# Patient Record
(demographics unavailable — no encounter records)

---

## 2025-03-28 NOTE — HISTORY OF PRESENT ILLNESS
[N] : Patient denies prior pregnancies [TextBox_4] : 22-year-old  0 LMP: 2025, presents for her first GYN examination.  Medical history significant for thyroid cancer. Status post complete thyroidectomy in . She also reports experiencing depression and thoughts of harming herself, has never talked to anyone about it. She noticed recent increased malodorous vaginal odor. [PapSmeardate] : as per patient she never has done before [LMPDate] : 03/24/2025 [MensesFreq] : irregular due to thyroidectomy [MensesLength] : 3 [PGHxTotal] : 0 [FreeTextEntry1] : 03/24/2025

## 2025-03-28 NOTE — PHYSICAL EXAM
[Chaperone Present] : A chaperone was present in the examining room during all aspects of the physical examination [Oriented x3] : oriented x3 [Examination Of The Breasts] : a normal appearance [No Discharge] : no discharge [No Masses] : no breast masses were palpable [Labia Majora] : normal [Labia Minora] : normal [No Bleeding] : There was no active vaginal bleeding [Normal] : normal [Normal Position] : in a normal position [Uterine Adnexae] : normal [FreeTextEntry2] : Appropriately responsive, alert, and no acute distress. [FreeTextEntry3] : Status post thyroidectomy. Thyroid is absent. [FreeTextEntry7] : Soft. Nondistended. Nontender. Mild lower quadrant tenderness. No rebound or guarding. There are no palpable masses. [FreeTextEntry1] : External genitalia are within normal limits with no lesions visualized. [FreeTextEntry4] : Scant amount of blood noted within the vaginal vault consistent with menstruation. No discharge or lesions noted. [FreeTextEntry5] : A speculum was inserted without any difficulty. The cervix was normal in appearance. Pap smear and cultures were obtained. No cervical motion tenderness. [FreeTextEntry6] : Bimanual examination was notable for a normal, nontender uterus. Mild uterine tenderness. There were no palpable adnexal masses or adnexal tenderness.

## 2025-03-28 NOTE — REVIEW OF SYSTEMS
[Patient Intake Form Reviewed] : Patient intake form was reviewed [FreeTextEntry8] : Malodorous vaginal odor [de-identified] : Depression, suicidal ideation

## 2025-03-28 NOTE — PLAN
[FreeTextEntry1] : Wellness exam. Normal physical examination. Recommendations: Ophthalmological, dental, and dermatological examinations.   The patient declines contraception.  Patient is not presently sexually active. Recommend condom use for the prevention of STD transmission. Information was given regarding Gardasil vaccine. Reviewed self-breast examination.   Discussed proper nutrition and physical exercise. Reviewed age-appropriate vaccinations.  Vaginal odor.  Cultures obtained. Results pending.  History of depression with suicidal ideation.  The patient was referred to the 24-hour walk-in clinic for an immediate evaluation at [facility].